# Patient Record
Sex: MALE | Race: WHITE | NOT HISPANIC OR LATINO | Employment: UNEMPLOYED | URBAN - METROPOLITAN AREA
[De-identification: names, ages, dates, MRNs, and addresses within clinical notes are randomized per-mention and may not be internally consistent; named-entity substitution may affect disease eponyms.]

---

## 2022-08-20 ENCOUNTER — OFFICE VISIT (OUTPATIENT)
Dept: URGENT CARE | Facility: MEDICAL CENTER | Age: 27
End: 2022-08-20
Payer: COMMERCIAL

## 2022-08-20 VITALS
HEART RATE: 89 BPM | HEIGHT: 64 IN | RESPIRATION RATE: 20 BRPM | DIASTOLIC BLOOD PRESSURE: 68 MMHG | SYSTOLIC BLOOD PRESSURE: 106 MMHG | TEMPERATURE: 100.5 F | WEIGHT: 136.02 LBS | BODY MASS INDEX: 23.22 KG/M2 | OXYGEN SATURATION: 96 %

## 2022-08-20 DIAGNOSIS — R51.9 RIGHT SIDED FACIAL PAIN: Primary | ICD-10-CM

## 2022-08-20 PROCEDURE — 99213 OFFICE O/P EST LOW 20 MIN: CPT | Performed by: NURSE PRACTITIONER

## 2022-08-20 RX ORDER — CEFDINIR 300 MG/1
300 CAPSULE ORAL EVERY 12 HOURS SCHEDULED
Qty: 14 CAPSULE | Refills: 0 | Status: SHIPPED | OUTPATIENT
Start: 2022-08-20 | End: 2022-08-27

## 2022-08-20 RX ORDER — SILDENAFIL CITRATE 20 MG/1
20 TABLET ORAL 3 TIMES DAILY
COMMUNITY
Start: 2022-07-19

## 2022-08-20 RX ORDER — ASPIRIN 81 MG/1
TABLET ORAL
COMMUNITY

## 2022-08-20 RX ORDER — BUPROPION HYDROCHLORIDE 150 MG/1
150 TABLET ORAL DAILY
COMMUNITY
Start: 2022-07-21

## 2022-08-20 NOTE — PROGRESS NOTES
[facility]        NAME: Tres Polanco is a 32 y o  male  : 1995    MRN: [id number]  DATE: 2022  TIME: 2:39 PM    Assessment and Plan   Right sided facial pain [R51 9]  1  Right sided facial pain       Start course of cefdinir   Continue tylenol/motrin as needed for fever chills body aches   ER with new or worsening symptoms  F/u with pcp on Monday   Pt and mom in agreement with plan of care    Patient Instructions     Follow up with PCP in 3-5 days  Proceed to  ER if symptoms worsen  Chief Complaint     Chief Complaint   Patient presents with    Fever     Patient states he started with chills on Wednesday  Yesterday he started with R earache, sore throat on R side, and glands swollen, R eye and cheek is puffy  Last night  fever of 101 9  Pt has been vaccinated, and tested positive  last          History of Present Illness   Tres Polanco presents to the clinic c/o    States on Tues night felt chills,   [place] and thought was related to the sun/heat exposure - continued to happen through the week   Came home last night and notice fever of 102 8, right sided facial pain, swelling, sore throat on the right that goes into the ear  Had covid in May around mother's day   Tried to do an at home test but accidentally spilled the liquid       Review of Systems   Review of Systems   All other systems reviewed and are negative          Current Medications     Long-Term Medications   Medication Sig Dispense Refill    buPROPion (WELLBUTRIN XL) 150 mg 24 hr tablet Take 150 mg by mouth daily      aspirin (ECOTRIN LOW STRENGTH) 81 mg EC tablet Take by mouth      multivitamin-minerals (CENTRUM) tablet Take by mouth daily      sildenafil (REVATIO) 20 mg tablet Take 20 mg by mouth 3 (three) times a day         Current Allergies     Allergies as of 2022    (No Known Allergies)            The following portions of the patient's history were reviewed and updated as appropriate: allergies, current medications, past family history, past medical history, past social history, past surgical history and problem list     Objective   /68   Pulse 89   Temp 100 5 °F (38 1 °C)   Resp 20   Ht 5' 4" (1 626 m)   Wt 61 7 kg (136 lb 0 4 oz)   SpO2 96%   BMI 23 35 kg/m²        Physical Exam     Physical Exam  Vitals and nursing note reviewed  Constitutional:       Appearance: Normal appearance  He is well-developed  HENT:      Head: Normocephalic and atraumatic  Right Ear: Hearing, tympanic membrane, ear canal and external ear normal       Left Ear: Hearing, tympanic membrane, ear canal and external ear normal       Nose: Mucosal edema and congestion present  Right Sinus: No maxillary sinus tenderness or frontal sinus tenderness  Left Sinus: No maxillary sinus tenderness or frontal sinus tenderness  Mouth/Throat:      Mouth: Mucous membranes are moist       Pharynx: Oropharynx is clear  Posterior oropharyngeal erythema present  Tonsils: No tonsillar exudate  Eyes:      General: Lids are normal       Conjunctiva/sclera: Conjunctivae normal       Pupils: Pupils are equal, round, and reactive to light  Cardiovascular:      Rate and Rhythm: Normal rate and regular rhythm  Heart sounds: Normal heart sounds, S1 normal and S2 normal    Pulmonary:      Effort: Pulmonary effort is normal       Breath sounds: Normal breath sounds  Skin:     General: Skin is warm and dry  Neurological:      Mental Status: He is alert  Psychiatric:         Speech: Speech normal          Behavior: Behavior normal          Thought Content:  Thought content normal          Judgment: Judgment normal

## 2022-11-03 ENCOUNTER — OFFICE VISIT (OUTPATIENT)
Dept: URGENT CARE | Facility: MEDICAL CENTER | Age: 27
End: 2022-11-03

## 2022-11-03 VITALS
DIASTOLIC BLOOD PRESSURE: 76 MMHG | RESPIRATION RATE: 18 BRPM | HEART RATE: 72 BPM | SYSTOLIC BLOOD PRESSURE: 128 MMHG | TEMPERATURE: 97.7 F | OXYGEN SATURATION: 97 %

## 2022-11-03 DIAGNOSIS — R09.82 POST-NASAL DRIP: Primary | ICD-10-CM

## 2022-11-03 RX ORDER — FLUTICASONE PROPIONATE 50 MCG
1 SPRAY, SUSPENSION (ML) NASAL 2 TIMES DAILY
Qty: 11.1 ML | Refills: 0 | Status: SHIPPED | OUTPATIENT
Start: 2022-11-03

## 2022-11-03 NOTE — PROGRESS NOTES
[facility]        NAME: Ashlee Valdes is a 32 y o  male  : 1995    MRN: [id number]  DATE: November 3, 2022  TIME: 1:11 PM    Assessment and Orders   Post-nasal drip [R09 82]  1  Post-nasal drip  fluticasone (FLONASE) 50 mcg/act nasal spray         Plan and Discussion      Symptoms and exam consistent with postnasal drip  Will treat with Flonase b i d   Also recommended over-the-counter antihistamine  If symptoms persist or worsen, patient should follow up with PCP for possible ENT referral      Risks and benefits discussed  Patient understands and agrees with the plan  Follow up with PCP  Chief Complaint     Chief Complaint   Patient presents with   • bad breath     Patient c/o bad breath and smelly mucus in his  throat x 2-3 weeks          History of Present Illness       HPI     Patient is a 49-year-old male who presents with halitosis and mucus in the back of his throat  Patient states this has been going on for few weeks now  He recently had an upper respiratory illness with associated cough and headaches which has since resolved  He states that he constantly feels like he has to clear his throat and when he does he coughs up foul-smelling mucus that is yellow in color  He denies sinus pressure pain  He denies recent fevers  He notes he does have some rhinorrhea but denies nasal congestion      Review of Systems   Review of Systems  As stated in HPI    Current Medications       Current Outpatient Medications:   •  fluticasone (FLONASE) 50 mcg/act nasal spray, 1 spray into each nostril 2 (two) times a day, Disp: 11 1 mL, Rfl: 0  •  aspirin (ECOTRIN LOW STRENGTH) 81 mg EC tablet, Take by mouth, Disp: , Rfl:   •  buPROPion (WELLBUTRIN XL) 150 mg 24 hr tablet, Take 150 mg by mouth daily, Disp: , Rfl:   •  multivitamin-minerals (CENTRUM) tablet, Take by mouth daily, Disp: , Rfl:   •  sildenafil (REVATIO) 20 mg tablet, Take 20 mg by mouth 3 (three) times a day, Disp: , Rfl: Current Allergies     Allergies as of 11/03/2022   • (No Known Allergies)            The following portions of the patient's history were reviewed and updated as appropriate: allergies, current medications, past family history, past medical history, past social history, past surgical history and problem list      No past medical history on file  Past Surgical History:   Procedure Laterality Date   • CARDIAC VALVE SURGERY     • EYE SURGERY     • NOSE SURGERY         No family history on file  Medications have been verified  Objective   /76   Pulse 72   Temp 97 7 °F (36 5 °C)   Resp 18   SpO2 97%   No LMP for male patient  Physical Exam     Physical Exam  Constitutional:       General: He is not in acute distress  Appearance: He is not ill-appearing or toxic-appearing  HENT:      Head: Normocephalic and atraumatic  Right Ear: Tympanic membrane and external ear normal       Left Ear: Tympanic membrane and external ear normal       Nose: Congestion present  Comments: Boggy nasal turbinates     Mouth/Throat:      Mouth: Mucous membranes are moist  Oral lesions (Patch of dry and scaling mucosa on inside of left cheek) present  No angioedema  Dentition: Normal dentition  No dental tenderness, gingival swelling, dental caries, dental abscesses or gum lesions  Tongue: No lesions  Tongue does not deviate from midline  Pharynx: Posterior oropharyngeal erythema and uvula swelling present  No pharyngeal swelling or oropharyngeal exudate  Tonsils: No tonsillar abscesses  Comments: Cobblestone appearance in posterior pharynx  Cardiovascular:      Rate and Rhythm: Normal rate and regular rhythm  Pulmonary:      Effort: Pulmonary effort is normal  No respiratory distress  Breath sounds: No wheezing  Neurological:      General: No focal deficit present  Mental Status: He is alert and oriented to person, place, and time     Psychiatric: Mood and Affect: Mood normal          Behavior: Behavior normal          Thought Content:  Thought content normal          Judgment: Judgment normal                Angelito Dumont DO